# Patient Record
Sex: FEMALE | Race: WHITE | NOT HISPANIC OR LATINO | Employment: FULL TIME | URBAN - METROPOLITAN AREA
[De-identification: names, ages, dates, MRNs, and addresses within clinical notes are randomized per-mention and may not be internally consistent; named-entity substitution may affect disease eponyms.]

---

## 2017-10-17 PROBLEM — Z86.69 HX OF MIGRAINE HEADACHES: Status: ACTIVE | Noted: 2017-10-17

## 2018-06-22 PROBLEM — N13.30 HYDRONEPHROSIS: Status: ACTIVE | Noted: 2018-06-22

## 2018-06-22 PROBLEM — N13.5 URETERAL STRICTURE, LEFT: Status: ACTIVE | Noted: 2018-06-22

## 2019-07-05 PROBLEM — N13.8 OTHER OBSTRUCTIVE AND REFLUX UROPATHY: Status: ACTIVE | Noted: 2019-07-05

## 2020-07-29 PROBLEM — Z87.442 HISTORY OF RENAL CALCULI: Status: ACTIVE | Noted: 2017-10-17

## 2020-07-29 PROBLEM — G43.009 MIGRAINE WITHOUT AURA: Status: ACTIVE | Noted: 2020-07-29

## 2020-07-29 PROBLEM — M54.2 NECK PAIN: Status: ACTIVE | Noted: 2020-07-29

## 2020-07-29 PROBLEM — M50.20 DISPLACEMENT OF CERVICAL INTERVERTEBRAL DISC WITHOUT MYELOPATHY: Status: ACTIVE | Noted: 2020-07-29

## 2021-05-10 ENCOUNTER — PATIENT MESSAGE (OUTPATIENT)
Dept: RESEARCH | Facility: HOSPITAL | Age: 31
End: 2021-05-10

## 2023-07-03 PROBLEM — E78.1 HYPERTRIGLYCERIDEMIA: Status: ACTIVE | Noted: 2023-07-03

## 2024-03-15 ENCOUNTER — TELEPHONE (OUTPATIENT)
Dept: UROLOGY | Facility: CLINIC | Age: 34
End: 2024-03-15
Payer: COMMERCIAL

## 2024-03-15 NOTE — TELEPHONE ENCOUNTER
----- Message from Bell Fuentes sent at 3/15/2024 11:40 AM CDT -----  Contact: self  Type:  Sooner Appointment Request    Caller is requesting a sooner appointment.  Caller declined first available appointment listed below.  Caller will not accept being placed on the waitlist and is requesting a message be sent to doctor.    Name of Caller:  pt  When is the first available appointment?  N/a  Symptoms:  would like to review her chart from dr zuniga per pcp  Would the patient rather a call back or a response via [facility]? call  Best Call Back Number:  [phone]   Additional Information:  please call to discuss

## 2024-07-16 ENCOUNTER — TELEPHONE (OUTPATIENT)
Dept: UROLOGY | Facility: CLINIC | Age: 34
End: 2024-07-16

## 2024-07-16 ENCOUNTER — OFFICE VISIT (OUTPATIENT)
Dept: UROLOGY | Facility: CLINIC | Age: 34
End: 2024-07-16
Payer: COMMERCIAL

## 2024-07-16 VITALS — HEIGHT: 64 IN | BODY MASS INDEX: 30.19 KG/M2 | WEIGHT: 176.81 LBS

## 2024-07-16 DIAGNOSIS — Z87.448 HISTORY OF PYELOPLASTY: Primary | ICD-10-CM

## 2024-07-16 DIAGNOSIS — Z98.890 STATUS POST URETERAL REIMPLANTATION: ICD-10-CM

## 2024-07-16 DIAGNOSIS — Q62.11 HYDRONEPHROSIS WITH URETEROPELVIC JUNCTION (UPJ) OBSTRUCTION: ICD-10-CM

## 2024-07-16 DIAGNOSIS — Z98.890 HISTORY OF PYELOPLASTY: Primary | ICD-10-CM

## 2024-07-16 LAB
BACTERIA #/AREA URNS HPF: ABNORMAL /HPF
BILIRUBIN, UA POC OHS: NEGATIVE
BLOOD, UA POC OHS: ABNORMAL
CLARITY, UA POC OHS: CLEAR
COLOR, UA POC OHS: YELLOW
GLUCOSE, UA POC OHS: NEGATIVE
KETONES, UA POC OHS: NEGATIVE
LEUKOCYTES, UA POC OHS: ABNORMAL
MICROSCOPIC COMMENT: ABNORMAL
NITRITE, UA POC OHS: POSITIVE
PH, UA POC OHS: 6
PROTEIN, UA POC OHS: NEGATIVE
RBC #/AREA URNS HPF: 1 /HPF (ref 0–4)
SPECIFIC GRAVITY, UA POC OHS: 1.01
UROBILINOGEN, UA POC OHS: 0.2
WBC #/AREA URNS HPF: 5 /HPF (ref 0–5)
YEAST URNS QL MICRO: ABNORMAL

## 2024-07-16 PROCEDURE — 87086 URINE CULTURE/COLONY COUNT: CPT | Performed by: STUDENT IN AN ORGANIZED HEALTH CARE EDUCATION/TRAINING PROGRAM

## 2024-07-16 PROCEDURE — 81000 URINALYSIS NONAUTO W/SCOPE: CPT | Mod: PO | Performed by: STUDENT IN AN ORGANIZED HEALTH CARE EDUCATION/TRAINING PROGRAM

## 2024-07-16 PROCEDURE — 3044F HG A1C LEVEL LT 7.0%: CPT | Mod: CPTII,S$GLB,, | Performed by: STUDENT IN AN ORGANIZED HEALTH CARE EDUCATION/TRAINING PROGRAM

## 2024-07-16 PROCEDURE — 81003 URINALYSIS AUTO W/O SCOPE: CPT | Mod: QW,S$GLB,, | Performed by: STUDENT IN AN ORGANIZED HEALTH CARE EDUCATION/TRAINING PROGRAM

## 2024-07-16 PROCEDURE — 99999 PR PBB SHADOW E&M-EST. PATIENT-LVL III: CPT | Mod: PBBFAC,,, | Performed by: STUDENT IN AN ORGANIZED HEALTH CARE EDUCATION/TRAINING PROGRAM

## 2024-07-16 PROCEDURE — 3008F BODY MASS INDEX DOCD: CPT | Mod: CPTII,S$GLB,, | Performed by: STUDENT IN AN ORGANIZED HEALTH CARE EDUCATION/TRAINING PROGRAM

## 2024-07-16 PROCEDURE — 1159F MED LIST DOCD IN RCRD: CPT | Mod: CPTII,S$GLB,, | Performed by: STUDENT IN AN ORGANIZED HEALTH CARE EDUCATION/TRAINING PROGRAM

## 2024-07-16 PROCEDURE — 99204 OFFICE O/P NEW MOD 45 MIN: CPT | Mod: S$GLB,,, | Performed by: STUDENT IN AN ORGANIZED HEALTH CARE EDUCATION/TRAINING PROGRAM

## 2024-07-16 NOTE — PROGRESS NOTES
"[facility]   Clinic Note    Subjective:     Chief Complaint: Establish Care    History of Present Illness:  Socorro Medina is a 33 y.o. female who presents to clinic for evaluation and management of multiple urologic issues. She is new to our clinic referred by Dr. Hilda Wu    She has an extensive urologic surgical history. 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 She then had robotic left pyeloplasty 7/2019. Surgeries with Dr. Small. She presents today for a second opinion. NM Renal scan from 6/2019 pre-op showed no evidence of obstruction with normal split function.  CT from 04/20/2024 shows mild hydronephrosis to the level of the UPJ with thinning of the parenchyma on the left.  There was no significant hydroureter distally.  This is overall similar in appearance to a CT from 2020.  Imaging independently reviewed and interpreted.    She reports intermittent flank pain worse with voiding.  He reports UTIs blood symptoms mostly include the flank pain and cloudy urine.  She has no significant dysuria.    Past medical, family, surgical and social history reviewed as documented in chart with pertinent positive medical, family, surgical and social history detailed in HPI.    A review systems was conducted with pertinent positive and negative findings documented in HPI.    Objective:     Estimated body mass index is 30.35 kg/m² as calculated from the following:    Height as of this encounter: 5' 4" (1.626 m).    Weight as of this encounter: 80.2 kg (176 lb 12.9 oz).    Vital Signs (Most Recent)       Physical Exam  Constitutional:       General: She is not in acute distress.     Appearance: She is well-developed. She is not ill-appearing or toxic-appearing.   Pulmonary:      Effort: Pulmonary effort is normal. No accessory muscle usage or respiratory distress.   Neurological:      Mental Status: She is alert.         Labs reviewed " below:  Lab Results   Component Value Date    BUN 11 03/25/2024    CREATININE 0.75 03/25/2024    WBC 7.75 02/16/2024    HGB 13.7 02/16/2024    HCT 42.2 02/16/2024     02/16/2024    AST 69 (H) 03/25/2024     (H) 03/25/2024    ALKPHOS 44 03/25/2024    ALBUMIN 4.5 03/25/2024    HGBA1C 5.4 02/16/2024      Urine dipstick today showed trace blood, small leukocyte esterase, and positive nitrite.     Assessment:     1. History of pyeloplasty    2. Hydronephrosis with ureteropelvic junction (UPJ) obstruction    3. Status post ureteral reimplantation      Plan:     We reviewed her surgical history, previous and most recent imaging, and current symptoms   We discussed the likely reflux of urine as an etiology of her flank pain   We discussed the true symptoms and diagnosis of a UTI  Recommend Mag 3 scan to ensure adequate drainage   Can proceed with VCUG if there is equivocal findings    Osiel Chang MD

## 2024-07-17 LAB
BACTERIA UR CULT: NORMAL
BACTERIA UR CULT: NORMAL